# Patient Record
Sex: MALE | Race: OTHER | Employment: OTHER | URBAN - METROPOLITAN AREA
[De-identification: names, ages, dates, MRNs, and addresses within clinical notes are randomized per-mention and may not be internally consistent; named-entity substitution may affect disease eponyms.]

---

## 2018-04-09 NOTE — PATIENT DISCUSSION
Discussed importance of compliance with ocular medications and follow up exams to prevent loss of vision.

## 2019-03-07 NOTE — PATIENT DISCUSSION
Discussed need for additional gtt vs SLT.  Pt elects SLT.  Plan SLT OD today.  +/- SLT OS next visit.

## 2019-03-07 NOTE — PROCEDURE NOTE: CLINICAL
PROCEDURE NOTE: SLT OD. Diagnosis: POAG, Mild. Anesthesia: Topical. Prep: Alphagan 0.15%. Prior to treatment, risks/benefits/alternatives discussed including infection, loss of vision, hemorrhage, cataract, glaucoma, retinal tears or detachment. Lens:  SLT laser lens with goniosol. Power: 0.9mJ. Total applications: 920. Application 764 degrees. Patient tolerated procedure well. There were no complications. Post-op instructions given. Post-op IOP = 24 mmHg. Antoine Toribio

## 2019-04-11 NOTE — PROCEDURE NOTE: CLINICAL
PROCEDURE NOTE: SLT OS. Diagnosis: POAG, Mild. Anesthesia: Topical. Prep: Alphagan 0.15%. Prior to treatment, risks/benefits/alternatives discussed including infection, loss of vision, hemorrhage, cataract, glaucoma, retinal tears or detachment. Lens:  SLT laser lens with goniosol. Power: 0.6mJ. Total applications: 172. Application 235 degrees. Patient tolerated procedure well. There were no complications. Post-op instructions given. Post-op IOP = 22 mmHg. J Carlos Greening

## 2021-10-12 ENCOUNTER — REFRACTIVE CONSULT (OUTPATIENT)
Dept: URBAN - METROPOLITAN AREA CLINIC 39 | Facility: CLINIC | Age: 51
End: 2021-10-12

## 2021-10-12 DIAGNOSIS — H52.7: ICD-10-CM

## 2021-10-12 PROCEDURE — 99499LK REFRACTIVE CONSULT/LASIK

## 2021-10-12 PROCEDURE — 92025NC COMP. CORNEAL TOPO, UNI OR BILAT,

## 2021-10-12 PROCEDURE — 92250 FUNDUS PHOTOGRAPHY W/I&R: CPT

## 2021-10-12 PROCEDURE — 92136TC INTERFEROMETRY - TECHNICAL COMPONENT

## 2021-10-12 PROCEDURE — 92134 CPTRZ OPH DX IMG PST SGM RTA: CPT

## 2021-10-12 PROCEDURE — 92286 ANT SGM IMG I&R SPECLR MIC: CPT

## 2021-10-12 ASSESSMENT — VISUAL ACUITY
OD_SC: 20/70-1
OS_SC: 20/30-2
OS_CC: 20/20
OS_SC: J3
OS_CC: J2
OD_SC: J2
OD_CC: J3
OD_CC: 20/20-1

## 2021-10-12 ASSESSMENT — TONOMETRY
OD_IOP_MMHG: 14
OS_IOP_MMHG: 15

## 2021-10-21 ENCOUNTER — ESTABLISHED PATIENT (OUTPATIENT)
Dept: URBAN - METROPOLITAN AREA SURGERY 14 | Facility: SURGERY | Age: 51
End: 2021-10-21

## 2021-10-21 ENCOUNTER — SURGERY/PROCEDURE (OUTPATIENT)
Dept: URBAN - METROPOLITAN AREA CLINIC 39 | Facility: CLINIC | Age: 51
End: 2021-10-21

## 2021-10-21 DIAGNOSIS — H52.7: ICD-10-CM

## 2021-10-21 PROCEDURE — 99211HP H&P OFFICE/OUTPATIENT VISIT, EST

## 2021-10-21 PROCEDURE — 66999LNSR LENSAR LASER FOR CAT SX

## 2021-10-21 PROCEDURE — 66999RAV RLE WITH ADVANCED LENS

## 2021-10-22 ENCOUNTER — CATARACT POST-OP 1-DAY (OUTPATIENT)
Dept: URBAN - METROPOLITAN AREA CLINIC 39 | Facility: CLINIC | Age: 51
End: 2021-10-22

## 2021-10-22 DIAGNOSIS — Z96.1: ICD-10-CM

## 2021-10-22 PROCEDURE — P6698455 NON-COMANAGED ADVANCED PO

## 2021-10-22 ASSESSMENT — TONOMETRY
OD_IOP_MMHG: 16
OS_IOP_MMHG: 15

## 2021-10-22 ASSESSMENT — VISUAL ACUITY
OD_SC: J1
OD: J2
OD_SC: 20/20-1
OS_SC: J3
OS_SC: 20/30

## 2021-10-25 ENCOUNTER — POST OP/EVAL OF SECOND EYE (OUTPATIENT)
Dept: URBAN - METROPOLITAN AREA CLINIC 35 | Facility: CLINIC | Age: 51
End: 2021-10-25

## 2021-10-25 DIAGNOSIS — Z96.1: ICD-10-CM

## 2021-10-25 PROCEDURE — 99024 POSTOP FOLLOW-UP VISIT: CPT

## 2021-10-25 ASSESSMENT — TONOMETRY
OD_IOP_MMHG: 19
OS_IOP_MMHG: 14

## 2021-10-25 ASSESSMENT — VISUAL ACUITY
OD_SC: 20/25
OU_SC: J1
OU: J6
OS_SC: 20/30-2

## 2021-10-28 ENCOUNTER — SURGERY/PROCEDURE (OUTPATIENT)
Dept: URBAN - METROPOLITAN AREA CLINIC 39 | Facility: CLINIC | Age: 51
End: 2021-10-28

## 2021-10-28 ENCOUNTER — ESTABLISHED PATIENT (OUTPATIENT)
Dept: URBAN - METROPOLITAN AREA SURGERY 14 | Facility: SURGERY | Age: 51
End: 2021-10-28

## 2021-10-28 ENCOUNTER — FOLLOW UP (OUTPATIENT)
Dept: URBAN - METROPOLITAN AREA CLINIC 39 | Facility: CLINIC | Age: 51
End: 2021-10-28

## 2021-10-28 DIAGNOSIS — Z96.1: ICD-10-CM

## 2021-10-28 DIAGNOSIS — H52.7: ICD-10-CM

## 2021-10-28 PROCEDURE — 99211HP H&P OFFICE/OUTPATIENT VISIT, EST

## 2021-10-28 PROCEDURE — 66999RAV RLE WITH ADVANCED LENS

## 2021-10-28 PROCEDURE — 66999LNSR LENSAR LASER FOR CAT SX

## 2021-10-28 PROCEDURE — 99211T TECH SERVICE

## 2021-10-28 ASSESSMENT — VISUAL ACUITY
OD_SC: 20/20
OD: J6
OS_SC: J3-
OS_SC: 20/30-2
OD_SC: J1+

## 2021-10-29 ENCOUNTER — CATARACT POST-OP 1-DAY (OUTPATIENT)
Dept: URBAN - METROPOLITAN AREA CLINIC 35 | Facility: CLINIC | Age: 51
End: 2021-10-29

## 2021-10-29 DIAGNOSIS — H52.7: ICD-10-CM

## 2021-10-29 DIAGNOSIS — Z96.1: ICD-10-CM

## 2021-10-29 PROCEDURE — 99024 POSTOP FOLLOW-UP VISIT: CPT

## 2021-10-29 ASSESSMENT — TONOMETRY
OD_IOP_MMHG: 16
OS_IOP_MMHG: 16

## 2021-10-29 ASSESSMENT — VISUAL ACUITY
OS_SC: 20/25-1
OD_SC: 20/25+1

## 2021-11-29 ENCOUNTER — POST-OP (OUTPATIENT)
Dept: URBAN - METROPOLITAN AREA CLINIC 35 | Facility: CLINIC | Age: 51
End: 2021-11-29

## 2021-11-29 DIAGNOSIS — H52.7: ICD-10-CM

## 2021-11-29 DIAGNOSIS — Z96.1: ICD-10-CM

## 2021-11-29 PROCEDURE — 99024 POSTOP FOLLOW-UP VISIT: CPT

## 2021-11-29 ASSESSMENT — VISUAL ACUITY
OU_SC: J1-
OU_SC: 20/20
OD_SC: 20/25+
OS_SC: J1-
OU: J4
OS_SC: 20/20-2
OD_SC: J2

## 2021-11-29 ASSESSMENT — TONOMETRY
OD_IOP_MMHG: 14
OS_IOP_MMHG: 14

## 2022-02-28 ENCOUNTER — COMPREHENSIVE EXAM (OUTPATIENT)
Dept: URBAN - METROPOLITAN AREA CLINIC 35 | Facility: CLINIC | Age: 52
End: 2022-02-28

## 2022-02-28 DIAGNOSIS — H52.7: ICD-10-CM

## 2022-02-28 PROCEDURE — 92134 CPTRZ OPH DX IMG PST SGM RTA: CPT

## 2022-02-28 PROCEDURE — 92014 COMPRE OPH EXAM EST PT 1/>: CPT

## 2022-02-28 ASSESSMENT — TONOMETRY
OS_IOP_MMHG: 13
OD_IOP_MMHG: 13

## 2022-02-28 ASSESSMENT — VISUAL ACUITY
OD_SC: J2
OS_SC: J1-
OS_SC: 20/25+2
OU_SC: 20/20-2
OD_SC: 20/25-2
OU_SC: J1-

## 2022-05-06 NOTE — PATIENT DISCUSSION
RECOMMEND CANALOPLASTY W/ HYDRUS VS ISTENT AT TIME OF CE/IOL FOR ADDED IOP CONTROL.  CONTINUE CURRENT MANAGEMENT AT THIS TIME.

## 2022-05-06 NOTE — PATIENT DISCUSSION
RECOMMEND CANALOPLASTY W/ ISTENT AT TIME OF CE/IOL FOR ADDED IOP CONTROL.  CONTINUE CURRENT MANAGEMENT AT THIS TIME.

## 2022-05-06 NOTE — PATIENT DISCUSSION
PLAN:  CE/IOL OS (W/ CANALOPLASTY W/ ISTENT) THEN OD (W/ CANALOPLASTY W/ HYDRUS VS ISTENT) GOAL LIZBETH, PT INTERESTED IN CUSTOM OU.

## 2022-06-17 NOTE — PATIENT DISCUSSION
1 week 4 Day PO: Patient is doing well post-operatively. The importance of post-op drop compliance was emphasized. Drop scheduled reviewed with patient. Patient to call if any visual changes or concerns.

## 2024-04-16 NOTE — PATIENT DISCUSSION
The patient feels that the cataract is significantly impacting daily activities and has elected cataract surgery. The risks, benefits, and alternatives to surgery were discussed. The patient elects to proceed with surgery. You can access the FollowMyHealth Patient Portal offered by [facility] by registering at the following website: [email or web address]. By joining [employer]’s FollowMyHealth portal, you will also be able to view your health information using other applications (apps) compatible with our system.

## 2024-05-29 NOTE — PATIENT DISCUSSION
Discussed condition and exacerbating conditions/situations (e.g., dry/arid environments, overhead fans, air conditioners, side effect of medications). Salt water gargles  Hydrate well   Keep the throat moist  Humidifier in the bedroom    Amoxil twice daily for 10 days  Steroid in the office for the swelling and pain    Change toothbrush in 48 hours or so  Change the pillowcases  Avoid sharing cups or utensils or reusing these without washing.  Wash hand often and keep hands and fingers out of the mouth  Complete ALL antibiotics as strep requires a full course to treat.  Return to school or work usually possible after 24 hours on the medication, so long as fever free for 24 hours.  Keep throat moist to reduce pain as well. Cool liquids will likely feel better than warm liquids.  Throat lozenges like Luden's or [place] Breezers help to reduce pain and lubricate the throat.    See primary doctor for the sleep study

## 2025-04-03 NOTE — PATIENT DISCUSSION
Continued pain of right lateral elbow despite oral and topical agents and PT    Reasonable to get xray -- possible referral to orthopedic surgery  Orders:    XR elbow 3+ vw right; Future     The IOP is in the target range. THICK CCT.